# Patient Record
Sex: MALE | Race: WHITE | Employment: PART TIME | URBAN - METROPOLITAN AREA
[De-identification: names, ages, dates, MRNs, and addresses within clinical notes are randomized per-mention and may not be internally consistent; named-entity substitution may affect disease eponyms.]

---

## 2017-11-02 NOTE — ED PROVIDER NOTES
Patient Seen in: [facility] In [place]    History   Patient presents with:  Alcohol Intoxication (neurologic)    Stated Complaint: Lear Bring    HPI    52-year-old male here for alcohol withdrawal.  He has been a heavy drinker Cardiovascular: Normal rate and regular rhythm. Pulmonary/Chest: Effort normal and breath sounds normal. No stridor. Abdominal: Soft. There is no tenderness. There is no guarding. Musculoskeletal: Exhibits no edema or tenderness.    Neurological: P cholecystitis. Care is discussed with Dr. Willie Siegel from [facility] gastroenterology.   Since the patient has not had any repeat episodes since 9 PM last night since he is hemodynamically stable has a normal hemoglobin and normal BUN, doubt that he has a significa [address]  [phone]    In 1 week      Gaby Reynoso [phone]    In 3 days        Medications Prescribed:  Discharge Medication List as of 11/2/2017  8:22 AM

## 2017-11-02 NOTE — ED INITIAL ASSESSMENT (HPI)
STS ETOH INTAKE OVER LAST 3 DAYS. STS HE FEELS LIKE HE IS HAVING ETOH WITHDRAWS. STS LAST ETOH INTAKE 4 HOURS AGO. DENIES N/V. PT A/O X3 AND COOPERATIVE.

## 2017-11-07 ENCOUNTER — APPOINTMENT (OUTPATIENT)
Dept: LAB | Age: 37
End: 2017-11-07
Attending: EMERGENCY MEDICINE
Payer: COMMERCIAL

## 2017-11-07 DIAGNOSIS — K29.01 ACUTE GASTRITIS WITH HEMORRHAGE, UNSPECIFIED GASTRITIS TYPE: ICD-10-CM

## 2017-11-07 PROCEDURE — 36415 COLL VENOUS BLD VENIPUNCTURE: CPT

## 2017-11-07 PROCEDURE — 85027 COMPLETE CBC AUTOMATED: CPT

## 2017-11-16 PROBLEM — F10.10 ALCOHOL ABUSE: Status: ACTIVE | Noted: 2017-11-16

## 2019-10-16 NOTE — ED NOTES
PT REPORTS HE DOES NOT THINK HE NEEDS HELP FROM [facility]. STATES HE WAS THERE BEFORE AND \"I TOOK A LOT AWAY FROM THAT PROGRAM\"  STATES HE WILL DO FINE AFTER HE GETS SOME MEDS FOR THE WITHDRAWAL SYMPTOMS.   DAD STATES HE WILL STAY WITH HIM FOR THE NEXT 10-12 H

## 2019-10-16 NOTE — ED PROVIDER NOTES
Patient Seen in: [facility] In [place]      History   Patient presents with:  Eval-D (detox)    Stated Complaint: etoh withdrawl- last drink within last couple hours    HPI    Patient a 36-year-old male who states he is an alcoholic. intact, pupils equal round reactive to light and accommodation. Mouth normal, neck supple, no meningismus. LUNGS: Lungs clear to auscultation bilaterally. CARDIOVASCULAR: + S1-S2, regular rate and rhythm, no murmurs.   BACK: No CVA tenderness, no midline time.  Patient father is with him and is willing to stay with him as he israel up. Patient is not suicidal not homicidal.  Patient is not had DTs or seizures in the past.  Patient was ambulatory steady gait conversant prior to discharge.   Recommend follow

## 2024-02-14 ENCOUNTER — APPOINTMENT (OUTPATIENT)
Dept: URGENT CARE | Age: 44
End: 2024-02-14

## 2024-02-14 ENCOUNTER — TELEPHONE (OUTPATIENT)
Dept: OTHER | Age: 44
End: 2024-02-14
